# Patient Record
Sex: MALE | Race: WHITE | Employment: UNEMPLOYED | URBAN - NONMETROPOLITAN AREA
[De-identification: names, ages, dates, MRNs, and addresses within clinical notes are randomized per-mention and may not be internally consistent; named-entity substitution may affect disease eponyms.]

---

## 2023-06-27 ENCOUNTER — OFFICE VISIT (OUTPATIENT)
Dept: ENT CLINIC | Age: 63
End: 2023-06-27
Payer: COMMERCIAL

## 2023-06-27 VITALS
HEIGHT: 71 IN | WEIGHT: 167 LBS | SYSTOLIC BLOOD PRESSURE: 124 MMHG | BODY MASS INDEX: 23.38 KG/M2 | DIASTOLIC BLOOD PRESSURE: 68 MMHG

## 2023-06-27 DIAGNOSIS — K21.9 LARYNGOPHARYNGEAL REFLUX (LPR): Primary | ICD-10-CM

## 2023-06-27 DIAGNOSIS — R09.89 GLOBUS SENSATION: ICD-10-CM

## 2023-06-27 PROBLEM — R09.A2 GLOBUS SENSATION: Status: ACTIVE | Noted: 2023-06-27

## 2023-06-27 PROCEDURE — 99203 OFFICE O/P NEW LOW 30 MIN: CPT | Performed by: PHYSICIAN ASSISTANT

## 2023-06-27 PROCEDURE — 31575 DIAGNOSTIC LARYNGOSCOPY: CPT | Performed by: PHYSICIAN ASSISTANT

## 2023-06-27 RX ORDER — ASPIRIN 81 MG/1
81 TABLET, CHEWABLE ORAL DAILY
COMMUNITY

## 2023-06-27 RX ORDER — HYDROCODONE BITARTRATE AND ACETAMINOPHEN 10; 325 MG/1; MG/1
TABLET ORAL
COMMUNITY
Start: 2023-06-02

## 2023-06-27 RX ORDER — CLONAZEPAM 0.5 MG/1
0.5 TABLET ORAL 2 TIMES DAILY
COMMUNITY
Start: 2023-04-03

## 2023-06-27 RX ORDER — OMEPRAZOLE 20 MG/1
20 CAPSULE, DELAYED RELEASE ORAL
Qty: 60 CAPSULE | Refills: 2 | Status: SHIPPED | OUTPATIENT
Start: 2023-06-27

## 2023-06-27 RX ORDER — FLUOXETINE HYDROCHLORIDE 20 MG/1
CAPSULE ORAL
COMMUNITY
Start: 2023-06-02

## 2023-06-27 RX ORDER — VITAMIN B COMPLEX
1 CAPSULE ORAL DAILY
COMMUNITY

## 2023-06-27 RX ORDER — TESTOSTERONE CYPIONATE 200 MG/ML
INJECTION, SOLUTION INTRAMUSCULAR
COMMUNITY
Start: 2023-06-02

## 2023-06-27 ASSESSMENT — ENCOUNTER SYMPTOMS
TROUBLE SWALLOWING: 0
FACIAL SWELLING: 0
RHINORRHEA: 0
SINUS PRESSURE: 0
EYE PAIN: 0
SINUS PAIN: 0
SORE THROAT: 0
VOICE CHANGE: 0
PHOTOPHOBIA: 0

## 2023-07-14 ENCOUNTER — TELEPHONE (OUTPATIENT)
Dept: ENT CLINIC | Age: 63
End: 2023-07-14

## 2023-07-17 ENCOUNTER — TELEPHONE (OUTPATIENT)
Dept: ENT CLINIC | Age: 63
End: 2023-07-17

## 2023-07-17 NOTE — TELEPHONE ENCOUNTER
Talk to the patient about his omeprazole. Patient reports that the omeprazole was making his reflux worse however he admitted to sporadic episode of constipation that required him to take a laxative. He reports that after the laxative his reflux got better. I advised the patient that the omeprazole and his pain medication is likely causing the constipation and that he will need to take a daily dose of MiraLAX. Advised the patient that if he has recurring reflux symptoms, he is to call the office and we can potentially could change to a different PPI.       Electronically signed by Ellen Salgado PA-C on 7/17/23 at 5:51 PM CDT

## 2023-08-08 ENCOUNTER — OFFICE VISIT (OUTPATIENT)
Dept: ENT CLINIC | Age: 63
End: 2023-08-08
Payer: COMMERCIAL

## 2023-08-08 VITALS
DIASTOLIC BLOOD PRESSURE: 64 MMHG | WEIGHT: 163 LBS | SYSTOLIC BLOOD PRESSURE: 122 MMHG | HEIGHT: 71 IN | BODY MASS INDEX: 22.82 KG/M2

## 2023-08-08 DIAGNOSIS — K21.9 LARYNGOPHARYNGEAL REFLUX (LPR): Primary | ICD-10-CM

## 2023-08-08 PROCEDURE — 99213 OFFICE O/P EST LOW 20 MIN: CPT | Performed by: PHYSICIAN ASSISTANT

## 2023-08-08 NOTE — PROGRESS NOTES
Mr. Chris Jordan is a pleasant 41-year-old  male that presents for a 6-week follow-up for LPR. 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 He continues to deny any dysphagia to include solid or liquids. Physical examination demonstrated obvious less hoarseness to vocalization. Ears demonstrate normal TMs and canals bilaterally. Nasal exam demonstrated no remarkable change. Oral exam still demonstrates some erythematous changes to the posterior pharynx with no masses. Neck exam demonstrated no lymphadenopathy or thyromegaly. Impression: Clinically improving LPR with twice daily PPI therapy    Plan: Patient is to continue the PPI therapy twice a day for another 6 weeks. He is follow-up in 6 weeks for reevaluation. At that point hopefully we can start weaning his medication to 1 a day. Patient was reminded to call if he has any questions or problems.       Electronically signed by Kerri Cummings PA-C on 8/8/23 at 9:17 AM KARISSA

## 2024-01-18 ENCOUNTER — OFFICE VISIT (OUTPATIENT)
Dept: ENT CLINIC | Age: 64
End: 2024-01-18
Payer: COMMERCIAL

## 2024-01-18 VITALS
DIASTOLIC BLOOD PRESSURE: 76 MMHG | BODY MASS INDEX: 23.24 KG/M2 | WEIGHT: 166 LBS | HEIGHT: 71 IN | SYSTOLIC BLOOD PRESSURE: 126 MMHG

## 2024-01-18 DIAGNOSIS — R49.0 HOARSENESS OF VOICE: Primary | ICD-10-CM

## 2024-01-18 PROCEDURE — 99213 OFFICE O/P EST LOW 20 MIN: CPT | Performed by: PHYSICIAN ASSISTANT

## 2024-01-18 PROCEDURE — 31575 DIAGNOSTIC LARYNGOSCOPY: CPT | Performed by: PHYSICIAN ASSISTANT

## 2024-01-18 ASSESSMENT — ENCOUNTER SYMPTOMS
SINUS PRESSURE: 0
VOICE CHANGE: 0
FACIAL SWELLING: 0
SINUS PAIN: 0
EYE PAIN: 0
PHOTOPHOBIA: 0
TROUBLE SWALLOWING: 0
RHINORRHEA: 0
SORE THROAT: 0

## 2024-01-18 NOTE — PROGRESS NOTES
[facility] OTOLARYNGOLOGY/ENT  Mr. Damon is a pleasant 63-year-old  male that presents for a follow-up due to vocal hoarseness.  Patient was initially seen back in June where he was found to have evidence of LPR based on laryngoscopy.  He was placed on omeprazole where the patient had a reaction with nausea and severe constipation.  Patient had discontinued his medication and tried apple cider vinegar mixed with honey and cinnamon.  He reports that after 2 weeks his hoarseness and globus sensation totally resolved.    Overall he was doing well until about a month ago when he has now noticed recurrence of his vocal hoarseness.  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  He also reports that his singing voice has never return from the initial evaluation.  He continues to deny any dysphagia to include solids or liquids.  Patient does not use tobacco products.        Allergies: Patient has no known allergies.      Current Outpatient Medications   Medication Sig Dispense Refill    FLUoxetine (PROZAC) 20 MG capsule       HYDROcodone-acetaminophen (NORCO)  MG per tablet       clonazePAM (KLONOPIN) 0.5 MG tablet Take 1 tablet by mouth 2 times daily.      testosterone cypionate (DEPOTESTOTERONE CYPIONATE) 200 MG/ML injection       b complex vitamins capsule Take 1 capsule by mouth daily      Flavoring Agent (MOLASSES FLAVOR) POWD by Does not apply route      aspirin 81 MG chewable tablet Take 1 tablet by mouth daily       No current facility-administered medications for this visit.       Past Surgical History:   Procedure Laterality Date    ARM SURGERY      GROIN SURGERY         Past Medical History:   Diagnosis Date    Anxiety     GERD (gastroesophageal reflux disease)     Nerve entrapment of lower limb     Sleep apnea        Family History   Problem Relation Age of Onset    Cancer Father     Cancer Sister     Cancer Paternal Aunt     Cancer Maternal

## 2024-01-18 NOTE — ASSESSMENT & PLAN NOTE
Recurrent hoarseness of voice likely secondary to low-grade LPR-improved compared to last laryngoscopy  Plan: I recommended 2 weeks of strict vocal rest due to his occupation.  I also recommended trying Pepcid twice a day for acid coverage.  Patient reports that he prefers apple cider vinegar regiment.  He is to follow-up with me in 6 weeks for reevaluation.

## 2024-02-05 ENCOUNTER — TELEPHONE (OUTPATIENT)
Dept: ENT CLINIC | Age: 64
End: 2024-02-05

## 2024-02-05 NOTE — TELEPHONE ENCOUNTER
Catarino requests that Artur Bowles return his call. The best time to reach him is Anytime. Catarino would like a call back to discuss possibly getting a scope on his vocal cords or discuss further evaluation if needed?     Thank you.

## 2024-02-20 ENCOUNTER — OFFICE VISIT (OUTPATIENT)
Dept: ENT CLINIC | Age: 64
End: 2024-02-20
Payer: COMMERCIAL

## 2024-02-20 VITALS
HEIGHT: 71 IN | DIASTOLIC BLOOD PRESSURE: 66 MMHG | WEIGHT: 167 LBS | BODY MASS INDEX: 23.38 KG/M2 | SYSTOLIC BLOOD PRESSURE: 118 MMHG

## 2024-02-20 DIAGNOSIS — K21.9 LARYNGOPHARYNGEAL REFLUX (LPR): ICD-10-CM

## 2024-02-20 DIAGNOSIS — R49.0 HOARSENESS OF VOICE: Primary | ICD-10-CM

## 2024-02-20 PROCEDURE — 99213 OFFICE O/P EST LOW 20 MIN: CPT | Performed by: PHYSICIAN ASSISTANT

## 2024-02-20 PROCEDURE — 31575 DIAGNOSTIC LARYNGOSCOPY: CPT | Performed by: PHYSICIAN ASSISTANT

## 2024-02-20 ASSESSMENT — ENCOUNTER SYMPTOMS
SORE THROAT: 0
PHOTOPHOBIA: 0
VOICE CHANGE: 0
RHINORRHEA: 0
EYE PAIN: 0
TROUBLE SWALLOWING: 0
SINUS PAIN: 0
SINUS PRESSURE: 0
FACIAL SWELLING: 0

## 2024-02-20 NOTE — PROGRESS NOTES
posterior wall of the nasopharyngeal region demonstrated no remarkable findings.  The scope was then advanced to the oropharyngeal region where the patient was found to have normal swallowing with no erythematous changes or any masses.  I advanced the scope to the epiglottis where the vocal cords were well-visualized.  The vocal cords were symmetrical and fully functional.  I did not appreciate any erythematous changes or any edema with the overall appearance noted back to normal.  Evaluation of the glottis demonstrated a normal-appearing surface with no edema or erythema.  The tongue was extended and demonstrated no lesions or masses to be present.  Patient tolerated the procedure nicely with no complications.       No orders of the defined types were placed in this encounter.      Electronically signed by LINDA MARROQUIN PA-C on 2/20/24 at 2:03 PM CST        Please note that this chart was generated using dragon dictation software.  Although every effort was made to ensure the accuracy of this automated transcription, some errors in transcription may have occurred.

## 2024-02-20 NOTE — ASSESSMENT & PLAN NOTE
Resolved hoarseness of voice with no evidence of LPR based on today's laryngoscopy  Plan: Due to the patient being back to baseline, I recommended a follow-up as needed.  He was reminded to call if he has any vocal changes or has any questions.

## 2024-05-07 ENCOUNTER — TELEPHONE (OUTPATIENT)
Dept: ENT CLINIC | Age: 64
End: 2024-05-07

## 2024-05-07 NOTE — TELEPHONE ENCOUNTER
Catarino called this morning to advise that his throat is still throbbing despite vocal rest. He wishes to go ahead with referral to [place], but if the doctor needs to see him first, he said by all means to give him a call.    Thank you.

## 2024-05-14 ENCOUNTER — OFFICE VISIT (OUTPATIENT)
Dept: ENT CLINIC | Age: 64
End: 2024-05-14
Payer: COMMERCIAL

## 2024-05-14 VITALS
DIASTOLIC BLOOD PRESSURE: 66 MMHG | BODY MASS INDEX: 22.82 KG/M2 | HEIGHT: 71 IN | SYSTOLIC BLOOD PRESSURE: 126 MMHG | WEIGHT: 163 LBS

## 2024-05-14 DIAGNOSIS — R49.0 HOARSENESS OF VOICE: Primary | ICD-10-CM

## 2024-05-14 PROCEDURE — 99212 OFFICE O/P EST SF 10 MIN: CPT | Performed by: PHYSICIAN ASSISTANT

## 2024-05-14 NOTE — PROGRESS NOTES
Mr. Damon is a very pleasant 63-year-old  male that represents to the clinic with complaints of recurrent vocal fatigue with hoarseness.  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    Initially he complained of having throat pain that worsens to the point that he developed vocal hoarseness.  His previous laryngoscopy demonstrated evidence of mild to moderate reflux disease and was attempted to be treated with PPI therapy.  Unfortunately he could not tolerate the PPI therapy due to constipation and nausea.  He self treated himself with apple cider vinegar.  He was also placed on vocal rest for a month and was noted to improve.  He continues to deny any dysphagia to include solids or liquids.  Also denies any true globus sensation.      Physical examination revealed the patient to have normal vocals with no hoarseness appreciated.  Ears demonstrate normal TMs canals bilaterally.  Oral exam demonstrated no masses or any abnormalities to the posterior pharynx.  The tongue demonstrated no lesions or any abnormalities to the surface of the tongue.  No buccal mucosal lesions were appreciated.  Neck exam demonstrated no lymphadenopathy or thyromegaly.      Impression: Recurrent vocal fatigue with hoarseness    Plan: Due to the patient's symptoms returning, I have recommended referring him to the [facility] [place] for further evaluation.  Overall I believe that this may be more of an over usage issue.  Patient was reminded to call if his symptoms worsen or if he has any questions.      Electronically signed by LINDA MARROQUIN PA-C on 5/14/24 at 2:49 PM CDT

## 2024-05-19 ENCOUNTER — OFFICE VISIT (OUTPATIENT)
Age: 64
End: 2024-05-19
Payer: COMMERCIAL

## 2024-05-19 VITALS
OXYGEN SATURATION: 97 % | SYSTOLIC BLOOD PRESSURE: 136 MMHG | TEMPERATURE: 97.7 F | WEIGHT: 168 LBS | RESPIRATION RATE: 20 BRPM | BODY MASS INDEX: 23.52 KG/M2 | HEART RATE: 97 BPM | HEIGHT: 71 IN | DIASTOLIC BLOOD PRESSURE: 74 MMHG

## 2024-05-19 DIAGNOSIS — H66.91 RIGHT OTITIS MEDIA, UNSPECIFIED OTITIS MEDIA TYPE: Primary | ICD-10-CM

## 2024-05-19 PROCEDURE — 99203 OFFICE O/P NEW LOW 30 MIN: CPT | Performed by: NURSE PRACTITIONER

## 2024-05-19 RX ORDER — AMOXICILLIN 500 MG/1
500 CAPSULE ORAL 2 TIMES DAILY
Qty: 20 CAPSULE | Refills: 0 | Status: SHIPPED | OUTPATIENT
Start: 2024-05-19 | End: 2024-05-29

## 2024-08-09 ENCOUNTER — OFFICE VISIT (OUTPATIENT)
Age: 64
End: 2024-08-09
Payer: COMMERCIAL

## 2024-08-09 ENCOUNTER — HOSPITAL ENCOUNTER (OUTPATIENT)
Dept: GENERAL RADIOLOGY | Age: 64
Discharge: HOME | End: 2024-08-09
Payer: COMMERCIAL

## 2024-08-09 VITALS
TEMPERATURE: 97.2 F | OXYGEN SATURATION: 98 % | BODY MASS INDEX: 22.79 KG/M2 | HEIGHT: 71 IN | HEART RATE: 77 BPM | SYSTOLIC BLOOD PRESSURE: 112 MMHG | DIASTOLIC BLOOD PRESSURE: 78 MMHG | RESPIRATION RATE: 20 BRPM | WEIGHT: 162.8 LBS

## 2024-08-09 DIAGNOSIS — S99.922A FOOT INJURY, LEFT, INITIAL ENCOUNTER: ICD-10-CM

## 2024-08-09 DIAGNOSIS — S99.922A FOOT INJURY, LEFT, INITIAL ENCOUNTER: Primary | ICD-10-CM

## 2024-08-09 DIAGNOSIS — M79.675 TOE PAIN, LEFT: ICD-10-CM

## 2024-08-09 PROCEDURE — 99213 OFFICE O/P EST LOW 20 MIN: CPT

## 2024-08-09 PROCEDURE — 73630 X-RAY EXAM OF FOOT: CPT

## 2024-08-09 ASSESSMENT — ENCOUNTER SYMPTOMS
VOMITING: 0
TROUBLE SWALLOWING: 0
APNEA: 0
DIARRHEA: 0
STRIDOR: 0
SORE THROAT: 0
CONSTIPATION: 0
ABDOMINAL PAIN: 0
WHEEZING: 0
CHOKING: 0
SHORTNESS OF BREATH: 0
COUGH: 0
CHEST TIGHTNESS: 0
SINUS PAIN: 0
FACIAL SWELLING: 0
COLOR CHANGE: 0
NAUSEA: 0
EYE DISCHARGE: 0
ABDOMINAL DISTENTION: 0
EYE PAIN: 0
SINUS PRESSURE: 0
EYE REDNESS: 0

## 2024-08-09 NOTE — PROGRESS NOTES
[facility]  [facility]  [address] [place] [id number]  Dept: [phone]  Dept Fax: [phone]  Loc: [phone]    Catarino Damon is a 63 y.o. male who presents today for his medical conditions/complaints as noted below.  Catarino Damon is complaining of Toe Injury (Couple weeks ago /Lt foot - great toe and second toe pain /)        HPI:   Catarino Damon presents to the clinic today with complaints of left first and second toe pain following an injury that occurred 2 weeks ago.  Patient reports he was walking in sandals when he hit his foot on a loose brick in the joyce, causing an injury to the left first and second toes.  He reports he has been elevating the left foot, applying heat, and taking Norco as prescribed for other pain.  Patient reports he took a Norco at approximately 6 AM this morning.  He reports that pain is tolerable with Norco, but he is requesting an x-ray to see if his toes are broken.  Denies numbness, tingling, paresthesias.  Symptoms are moderate.  Patient stable.          Past Medical History:   Diagnosis Date    Anxiety     GERD (gastroesophageal reflux disease)     Nerve entrapment of lower limb     Sleep apnea        Past Surgical History:   Procedure Laterality Date    ARM SURGERY      GROIN SURGERY         Family History   Problem Relation Age of Onset    Cancer Father     Cancer Sister     Cancer Paternal Aunt     Cancer Maternal Grandfather     Cancer Paternal Grandmother        Social History     Tobacco Use    Smoking status: Never    Smokeless tobacco: Never   Substance Use Topics    Alcohol use: Never        Current Outpatient Medications   Medication Sig Dispense Refill    HYDROcodone-acetaminophen (NORCO)  MG per tablet       clonazePAM (KLONOPIN) 0.5 MG tablet Take 1 tablet by mouth 2 times daily.      testosterone cypionate (DEPOTESTOTERONE CYPIONATE) 200 MG/ML injection       b complex vitamins

## 2024-08-09 NOTE — PROGRESS NOTES
Pt stated he walks around the house in sandals, may have loose brick in joyce, hit toes on the joyce and it bent toes back. Has been dealing with the pain for a couple of weeks now.   Pt stated for 2-3 weeks he has been elevating with heating.   Pt stated he is on pain medication for pain from nerve damage. He has taken that today at 0600  Xray is requested

## 2024-08-09 NOTE — PATIENT INSTRUCTIONS
Xray of left foot ordered; will call with results.    Rotate ice/heat as needed - use only 15 minutes at a time    Encouraged adequate rest    Recommended compression/bracing    Recommended elevation of the area    Patient may use ibuprofen or tylenol for pain    The patient is to follow up with PCP or return to clinic if symptoms worsen/fail to improve.    Any condition can change, despite proper treatment. Therefore, if symptoms still persist or worsen after treatment plan intitated today, either go to the nearest ER, or call PCP, or return to UC for further evaluation.    Urgent Care evaluation today is not a substitute for PCP visit. Follow up care is your responsibility to discuss and review this UC visit.

## 2024-10-16 ENCOUNTER — OFFICE VISIT (OUTPATIENT)
Age: 64
End: 2024-10-16
Payer: COMMERCIAL

## 2024-10-16 VITALS — WEIGHT: 163 LBS | HEIGHT: 71 IN | BODY MASS INDEX: 22.82 KG/M2

## 2024-10-16 DIAGNOSIS — S99.922A PLANTAR PLATE INJURY, LEFT, INITIAL ENCOUNTER: Primary | ICD-10-CM

## 2024-10-16 PROCEDURE — 99203 OFFICE O/P NEW LOW 30 MIN: CPT | Performed by: ORTHOPAEDIC SURGERY

## 2024-10-16 NOTE — PROGRESS NOTES
Orthopaedic Clinic Note    NAME:  Catarino Damon III   : 1960  MRN: [id number]      10/16/2024     CHIEF COMPLAINT:  Left foot pain      HISTORY OF PRESENT ILLNESS:   Catarino is a 63 y.o. male who presents to the office for evaluation and treatment of the left foot.  He injured his left great toe when he was stepping on a stair that collapsed approximately 10 weeks ago causing an extreme amount of dorsiflexion of the great toe.  He is attempted the use of a hard sole shoe, icing, and anti-inflammatories without relief and his pain is worsening.  Complains of burning on the plantar aspect of the great toe.    Past Medical History:        Diagnosis Date    Anxiety     GERD (gastroesophageal reflux disease)     Nerve entrapment of lower limb     Sleep apnea        Past Surgical History:        Procedure Laterality Date    ARM SURGERY      GROIN SURGERY      OTHER SURGICAL HISTORY      Hystoplasmosis       Current Medications:   Prior to Admission medications    Medication Sig Start Date End Date Taking? Authorizing Provider   HYDROcodone-acetaminophen (NORCO)  MG per tablet  23  Yes Isabel Anderson MD   clonazePAM (KLONOPIN) 0.5 MG tablet Take 1 tablet by mouth 2 times daily. 4/3/23  Yes Isabel Anderson MD   testosterone cypionate (DEPOTESTOTERONE CYPIONATE) 200 MG/ML injection  23  Yes Isabel Anderson MD   b complex vitamins capsule Take 1 capsule by mouth daily   Yes Isabel Anderson MD   Flavoring Agent (MOLASSES FLAVOR) POWD by Does not apply route   Yes Isabel Anderson MD   aspirin 81 MG chewable tablet Take 1 tablet by mouth daily  Patient not taking: Reported on 10/16/2024    Isabel Anderson MD       Allergies:  Patient has no known allergies.    Social History:   Social History     Occupational History    Not on file   Tobacco Use    Smoking status: Never    Smokeless tobacco: Never   Substance and Sexual Activity    Alcohol use: Never    Drug use: Never

## 2024-10-23 ENCOUNTER — HOSPITAL ENCOUNTER (OUTPATIENT)
Dept: MRI IMAGING | Age: 64
Discharge: HOME | End: 2024-10-23
Attending: ORTHOPAEDIC SURGERY
Payer: COMMERCIAL

## 2024-10-23 DIAGNOSIS — S99.922A PLANTAR PLATE INJURY, LEFT, INITIAL ENCOUNTER: ICD-10-CM

## 2024-10-23 PROCEDURE — 73718 MRI LOWER EXTREMITY W/O DYE: CPT

## 2024-10-24 DIAGNOSIS — S99.922A PLANTAR PLATE INJURY, LEFT, INITIAL ENCOUNTER: Primary | ICD-10-CM

## 2024-10-24 RX ORDER — MELOXICAM 15 MG/1
15 TABLET ORAL DAILY PRN
Qty: 30 TABLET | Refills: 1 | Status: SHIPPED | OUTPATIENT
Start: 2024-10-24

## 2024-10-26 DIAGNOSIS — S99.922A PLANTAR PLATE INJURY, LEFT, INITIAL ENCOUNTER: Primary | ICD-10-CM

## 2024-10-26 RX ORDER — METHYLPREDNISOLONE 4 MG/1
TABLET ORAL
Qty: 1 KIT | Refills: 0 | Status: SHIPPED | OUTPATIENT
Start: 2024-10-26 | End: 2024-11-01

## 2024-11-02 DIAGNOSIS — S99.922A PLANTAR PLATE INJURY, LEFT, INITIAL ENCOUNTER: Primary | ICD-10-CM

## 2024-11-02 RX ORDER — KETOROLAC TROMETHAMINE 10 MG/1
10 TABLET, FILM COATED ORAL EVERY 6 HOURS PRN
Qty: 20 TABLET | Refills: 0 | Status: SHIPPED | OUTPATIENT
Start: 2024-11-02

## 2025-03-05 ENCOUNTER — CLINICAL DOCUMENTATION (OUTPATIENT)
Age: 65
End: 2025-03-05

## 2025-03-05 NOTE — PROGRESS NOTES
Mr. Damon called and would like to proceed with surgery for his left foot.  He has severe MTP arthritis that has not improved with shoe wear changes and injections.  We discussed an MTP fusion, the risks, benefits, and alternatives.      He will be scheduled for surgery and return 2 weeks postop.    Electronically signed by William Venegas MD on 3/5/2025 at 1:01 PM

## 2025-03-06 ENCOUNTER — TELEPHONE (OUTPATIENT)
Age: 65
End: 2025-03-06

## 2025-03-06 NOTE — TELEPHONE ENCOUNTER
Spoke with patient and picked a date for surgery, 4/1/25 @ [facility] per patient request. Informed patient PreOp department would call for any labwork needed and he would be called day before surgery for confirmation of arrival time. Patient voiced understanding.

## 2025-03-13 ENCOUNTER — PREP FOR PROCEDURE (OUTPATIENT)
Age: 65
End: 2025-03-13

## 2025-03-13 DIAGNOSIS — M19.072 ARTHRITIS OF FIRST METATARSOPHALANGEAL (MTP) JOINT OF LEFT FOOT: Primary | ICD-10-CM

## 2025-03-13 RX ORDER — SODIUM CHLORIDE 0.9 % (FLUSH) 0.9 %
5-40 SYRINGE (ML) INJECTION EVERY 12 HOURS SCHEDULED
Status: CANCELLED | OUTPATIENT
Start: 2025-04-01

## 2025-03-13 RX ORDER — SODIUM CHLORIDE 0.9 % (FLUSH) 0.9 %
5-40 SYRINGE (ML) INJECTION PRN
Status: CANCELLED | OUTPATIENT
Start: 2025-04-01

## 2025-03-13 RX ORDER — SODIUM CHLORIDE 9 MG/ML
INJECTION, SOLUTION INTRAVENOUS PRN
Status: CANCELLED | OUTPATIENT
Start: 2025-04-01

## 2025-03-31 NOTE — OP NOTE
Patient Name: Brant  MRN: [id number]  : 1960    DATE of SURGERY: 2025    SURGEON: William Venegas MD    ASSISTANT: NONE     PREOPERATIVE DIAGNOSIS: Primary osteoarthritis Left 1st metatarsophalangeal joint (Hallux rigidus)     POSTOPERATIVE DIAGNOSIS: Primary osteoarthritis Left 1st metatarsophalangeal joint (Hallux rigidus)     PROCEDURE PERFORMED: Left 1st metatarsophalangeal joint arthrodesis.     IMPLANTS: Arthrex.     ANESTHESIA USED: General endotracheal anesthesia.     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  He had a hyperflexion injury months ago.  Radiographs and MRI confirmed severe arthritic change of this joint. We attempted conservative treatment with injections, anti-inflammatories, as well as changing shoes. All these failed to improve symptoms long term. After failing conservative treatment, the patient wished to proceed with surgery. The surgery of choice given the amount of arthritic change was that of a fusion of the 1st MTP joint, and the patient understood the risks, benefits and alternatives. The risks included but were not limited to that of anesthesia, bleeding, infection, pain, damage to local structures, need for further surgery, malunion, nonunion, hardware failure, loss of function.     ESTIMATED BLOOD LOSS: Less than 10 mL     SPECIMENS: None.     DRAINS: None.     COMPLICATIONS: None.     PROCEDURE IN DETAIL: The patient was seen in the preoperative holding room; once again the informed consent form was reviewed with the patient and signed. The site of surgery was marked with his agreement. The patient was transported to the operating room where a timeout was performed identifying the correct patient as well as the operative site. Two grams of IV Kefzol was given as perioperative antibiotics. The left lower extremity was prepped and draped in usual sterile fashion.  Mesenteric angiogram and possible embolization

## 2025-03-31 NOTE — DISCHARGE INSTRUCTIONS
Lower Extremity Post-op Instructions  Dr. FRANCOIS        POST-OP CARE: Please follow these instructions closely!    IMPORTANT PHONE NUMBERS:  For emergencies, please call 911  You may reach Dr. Francois or his medical assistants at [phone], M-F 8:00am-5:00pm  After 5pm or on the weekends, please call the answering service which can be reached from the number above   Call immediately if you have any of the following symptoms:  Elevated temperature above 101.5 degrees for more than 48 hours after surgery  Persistent drainage from wound  Severe pain around surgical site  Calf pain    Weight Bearing:   __x___ Heel walking in hard soled shoe only    Bathing:  If stated below, it is ok to remove your postop dressing and shower.  DO NOT SOAK the incision in water.  Pat the incision site dry after surgery  _x__ You may remove your dressing and shower on the 3rd day following surgery; if you shower before this, please cover your incision thoroughly  ___Keep your splint/dressing clean, dry, intact.  Do not place foreign objects inside the splint/dressing.    Dressings: Do NOT remove dressing/splint unless unless told to do so. SOME DRAINAGE IS NORMAL!  If you have a splint or cast, do NOT get wet!!    DO NOT touch, remove, or apply ointment to the incision and/or steri strips  Steri strips may fall off on their own  Signs of infection that warrant a phone call to our clinical line:  Excessive drainage or redness  Red streaking coming away from the incision  Increased pain  Increased temperature above 101.5 degrees    DRIVING:  Absolutely no driving while taking pain medication.  This will be discussed at your first postop visit.    Incision: Do NOT uncover unless you are told it is ok (see above).  Black sutures will need to be removed 10-14 days after surgery.    Elevate: Place 2 pillows under your ankle to get the incision area above the level of the heart to help in swelling (a recliner is not elevated!!!)    Ice: Ice

## 2025-04-01 ENCOUNTER — APPOINTMENT (OUTPATIENT)
Dept: GENERAL RADIOLOGY | Age: 65
End: 2025-04-01
Attending: ORTHOPAEDIC SURGERY
Payer: COMMERCIAL

## 2025-04-01 ENCOUNTER — HOSPITAL ENCOUNTER (OUTPATIENT)
Age: 65
Setting detail: OUTPATIENT SURGERY
Discharge: HOME | End: 2025-04-01
Attending: ORTHOPAEDIC SURGERY | Admitting: ORTHOPAEDIC SURGERY
Payer: COMMERCIAL

## 2025-04-01 ENCOUNTER — ANESTHESIA EVENT (OUTPATIENT)
Dept: OPERATING ROOM | Age: 65
End: 2025-04-01
Payer: COMMERCIAL

## 2025-04-01 ENCOUNTER — ANESTHESIA (OUTPATIENT)
Dept: OPERATING ROOM | Age: 65
End: 2025-04-01
Payer: COMMERCIAL

## 2025-04-01 VITALS
WEIGHT: 163 LBS | HEART RATE: 85 BPM | SYSTOLIC BLOOD PRESSURE: 125 MMHG | DIASTOLIC BLOOD PRESSURE: 82 MMHG | HEIGHT: 71 IN | OXYGEN SATURATION: 95 % | BODY MASS INDEX: 22.82 KG/M2 | TEMPERATURE: 98 F | RESPIRATION RATE: 16 BRPM

## 2025-04-01 DIAGNOSIS — M19.072 ARTHRITIS OF FIRST METATARSOPHALANGEAL (MTP) JOINT OF LEFT FOOT: ICD-10-CM

## 2025-04-01 DIAGNOSIS — S46.011D TRAUMATIC INCOMPLETE TEAR OF RIGHT ROTATOR CUFF, SUBSEQUENT ENCOUNTER: Primary | ICD-10-CM

## 2025-04-01 PROCEDURE — 7100000001 HC PACU RECOVERY - ADDTL 15 MIN: Performed by: ORTHOPAEDIC SURGERY

## 2025-04-01 PROCEDURE — 7100000000 HC PACU RECOVERY - FIRST 15 MIN: Performed by: ORTHOPAEDIC SURGERY

## 2025-04-01 PROCEDURE — 3600000013 HC SURGERY LEVEL 3 ADDTL 15MIN: Performed by: ORTHOPAEDIC SURGERY

## 2025-04-01 PROCEDURE — 73630 X-RAY EXAM OF FOOT: CPT

## 2025-04-01 PROCEDURE — 2580000003 HC RX 258: Performed by: ANESTHESIOLOGY

## 2025-04-01 PROCEDURE — 2709999900 HC NON-CHARGEABLE SUPPLY: Performed by: ORTHOPAEDIC SURGERY

## 2025-04-01 PROCEDURE — 2500000003 HC RX 250 WO HCPCS: Performed by: ANESTHESIOLOGY

## 2025-04-01 PROCEDURE — 7100000011 HC PHASE II RECOVERY - ADDTL 15 MIN: Performed by: ORTHOPAEDIC SURGERY

## 2025-04-01 PROCEDURE — 6360000002 HC RX W HCPCS: Performed by: ANESTHESIOLOGY

## 2025-04-01 PROCEDURE — 6360000002 HC RX W HCPCS: Performed by: ORTHOPAEDIC SURGERY

## 2025-04-01 PROCEDURE — 64445 NJX AA&/STRD SCIATIC NRV IMG: CPT | Performed by: NURSE ANESTHETIST, CERTIFIED REGISTERED

## 2025-04-01 PROCEDURE — 6360000002 HC RX W HCPCS: Performed by: NURSE ANESTHETIST, CERTIFIED REGISTERED

## 2025-04-01 PROCEDURE — 7100000010 HC PHASE II RECOVERY - FIRST 15 MIN: Performed by: ORTHOPAEDIC SURGERY

## 2025-04-01 PROCEDURE — C1713 ANCHOR/SCREW BN/BN,TIS/BN: HCPCS | Performed by: ORTHOPAEDIC SURGERY

## 2025-04-01 PROCEDURE — 3700000000 HC ANESTHESIA ATTENDED CARE: Performed by: ORTHOPAEDIC SURGERY

## 2025-04-01 PROCEDURE — 3700000001 HC ADD 15 MINUTES (ANESTHESIA): Performed by: ORTHOPAEDIC SURGERY

## 2025-04-01 PROCEDURE — 3600000003 HC SURGERY LEVEL 3 BASE: Performed by: ORTHOPAEDIC SURGERY

## 2025-04-01 PROCEDURE — 2500000003 HC RX 250 WO HCPCS: Performed by: ORTHOPAEDIC SURGERY

## 2025-04-01 PROCEDURE — 2500000003 HC RX 250 WO HCPCS: Performed by: NURSE ANESTHETIST, CERTIFIED REGISTERED

## 2025-04-01 DEVICE — SCREW CORTEX 3 X 18: Type: IMPLANTABLE DEVICE | Status: FUNCTIONAL

## 2025-04-01 DEVICE — IMPLANTABLE DEVICE: Type: IMPLANTABLE DEVICE | Status: FUNCTIONAL

## 2025-04-01 DEVICE — SCREW KRULOCK 3.0 X 16: Type: IMPLANTABLE DEVICE | Status: FUNCTIONAL

## 2025-04-01 DEVICE — SCREW KRULOCK 3.0 X 20: Type: IMPLANTABLE DEVICE | Status: FUNCTIONAL

## 2025-04-01 DEVICE — LP SCREW 3.0X16MM CORTICAL MTP TI: Type: IMPLANTABLE DEVICE | Status: FUNCTIONAL

## 2025-04-01 RX ORDER — SODIUM CHLORIDE 0.9 % (FLUSH) 0.9 %
5-40 SYRINGE (ML) INJECTION PRN
Status: DISCONTINUED | OUTPATIENT
Start: 2025-04-01 | End: 2025-04-01 | Stop reason: HOSPADM

## 2025-04-01 RX ORDER — SODIUM CHLORIDE 9 MG/ML
INJECTION, SOLUTION INTRAVENOUS PRN
Status: DISCONTINUED | OUTPATIENT
Start: 2025-04-01 | End: 2025-04-01 | Stop reason: HOSPADM

## 2025-04-01 RX ORDER — LIDOCAINE HYDROCHLORIDE 10 MG/ML
INJECTION, SOLUTION INFILTRATION; PERINEURAL
Status: DISCONTINUED | OUTPATIENT
Start: 2025-04-01 | End: 2025-04-01 | Stop reason: SDUPTHER

## 2025-04-01 RX ORDER — SODIUM CHLORIDE 0.9 % (FLUSH) 0.9 %
5-40 SYRINGE (ML) INJECTION EVERY 12 HOURS SCHEDULED
Status: DISCONTINUED | OUTPATIENT
Start: 2025-04-01 | End: 2025-04-01 | Stop reason: HOSPADM

## 2025-04-01 RX ORDER — ONDANSETRON 2 MG/ML
INJECTION INTRAMUSCULAR; INTRAVENOUS
Status: DISCONTINUED | OUTPATIENT
Start: 2025-04-01 | End: 2025-04-01 | Stop reason: SDUPTHER

## 2025-04-01 RX ORDER — EPHEDRINE SULFATE/0.9% NACL/PF 25 MG/5 ML
SYRINGE (ML) INTRAVENOUS
Status: DISCONTINUED | OUTPATIENT
Start: 2025-04-01 | End: 2025-04-01 | Stop reason: SDUPTHER

## 2025-04-01 RX ORDER — HYDROCODONE BITARTRATE AND ACETAMINOPHEN 10; 325 MG/1; MG/1
1 TABLET ORAL EVERY 6 HOURS PRN
Qty: 20 TABLET | Refills: 0 | Status: SHIPPED | OUTPATIENT
Start: 2025-04-01 | End: 2025-04-06

## 2025-04-01 RX ORDER — HYDROCODONE BITARTRATE AND ACETAMINOPHEN 10; 325 MG/1; MG/1
1 TABLET ORAL EVERY 6 HOURS PRN
Status: DISCONTINUED | OUTPATIENT
Start: 2025-04-01 | End: 2025-04-01 | Stop reason: HOSPADM

## 2025-04-01 RX ORDER — DEXAMETHASONE SODIUM PHOSPHATE 10 MG/ML
INJECTION, SOLUTION INTRAMUSCULAR; INTRAVENOUS
Status: DISCONTINUED | OUTPATIENT
Start: 2025-04-01 | End: 2025-04-01 | Stop reason: SDUPTHER

## 2025-04-01 RX ORDER — PROPOFOL 10 MG/ML
INJECTION, EMULSION INTRAVENOUS
Status: DISCONTINUED | OUTPATIENT
Start: 2025-04-01 | End: 2025-04-01 | Stop reason: SDUPTHER

## 2025-04-01 RX ORDER — ROPIVACAINE HYDROCHLORIDE 5 MG/ML
INJECTION, SOLUTION EPIDURAL; INFILTRATION; PERINEURAL
Status: COMPLETED | OUTPATIENT
Start: 2025-04-01 | End: 2025-04-01

## 2025-04-01 RX ORDER — ONDANSETRON 2 MG/ML
4 INJECTION INTRAMUSCULAR; INTRAVENOUS
Status: DISCONTINUED | OUTPATIENT
Start: 2025-04-01 | End: 2025-04-01 | Stop reason: HOSPADM

## 2025-04-01 RX ORDER — HYDROMORPHONE HYDROCHLORIDE 1 MG/ML
0.25 INJECTION, SOLUTION INTRAMUSCULAR; INTRAVENOUS; SUBCUTANEOUS EVERY 5 MIN PRN
Status: DISCONTINUED | OUTPATIENT
Start: 2025-04-01 | End: 2025-04-01 | Stop reason: HOSPADM

## 2025-04-01 RX ORDER — ONDANSETRON 4 MG/1
4 TABLET, FILM COATED ORAL EVERY 8 HOURS PRN
Qty: 10 TABLET | Refills: 0 | Status: SHIPPED | OUTPATIENT
Start: 2025-04-01

## 2025-04-01 RX ORDER — NALOXONE HYDROCHLORIDE 0.4 MG/ML
INJECTION, SOLUTION INTRAMUSCULAR; INTRAVENOUS; SUBCUTANEOUS PRN
Status: DISCONTINUED | OUTPATIENT
Start: 2025-04-01 | End: 2025-04-01 | Stop reason: HOSPADM

## 2025-04-01 RX ORDER — DEXAMETHASONE SODIUM PHOSPHATE 4 MG/ML
4 INJECTION, SOLUTION INTRA-ARTICULAR; INTRALESIONAL; INTRAMUSCULAR; INTRAVENOUS; SOFT TISSUE ONCE
Status: COMPLETED | OUTPATIENT
Start: 2025-04-01 | End: 2025-04-01

## 2025-04-01 RX ORDER — SODIUM CHLORIDE, SODIUM LACTATE, POTASSIUM CHLORIDE, CALCIUM CHLORIDE 600; 310; 30; 20 MG/100ML; MG/100ML; MG/100ML; MG/100ML
INJECTION, SOLUTION INTRAVENOUS CONTINUOUS
Status: DISCONTINUED | OUTPATIENT
Start: 2025-04-01 | End: 2025-04-01 | Stop reason: HOSPADM

## 2025-04-01 RX ORDER — FENTANYL CITRATE 50 UG/ML
INJECTION, SOLUTION INTRAMUSCULAR; INTRAVENOUS
Status: DISCONTINUED | OUTPATIENT
Start: 2025-04-01 | End: 2025-04-01 | Stop reason: SDUPTHER

## 2025-04-01 RX ORDER — HYDROMORPHONE HYDROCHLORIDE 1 MG/ML
0.5 INJECTION, SOLUTION INTRAMUSCULAR; INTRAVENOUS; SUBCUTANEOUS EVERY 5 MIN PRN
Status: COMPLETED | OUTPATIENT
Start: 2025-04-01 | End: 2025-04-01

## 2025-04-01 RX ORDER — ROPIVACAINE HYDROCHLORIDE 5 MG/ML
30 INJECTION, SOLUTION EPIDURAL; INFILTRATION; PERINEURAL ONCE
Status: DISCONTINUED | OUTPATIENT
Start: 2025-04-01 | End: 2025-04-01 | Stop reason: HOSPADM

## 2025-04-01 RX ORDER — MIDAZOLAM HYDROCHLORIDE 1 MG/ML
2 INJECTION, SOLUTION INTRAMUSCULAR; INTRAVENOUS ONCE
Status: COMPLETED | OUTPATIENT
Start: 2025-04-01 | End: 2025-04-01

## 2025-04-01 RX ADMIN — HYDROMORPHONE HYDROCHLORIDE 0.5 MG: 1 INJECTION, SOLUTION INTRAMUSCULAR; INTRAVENOUS; SUBCUTANEOUS at 09:09

## 2025-04-01 RX ADMIN — MIDAZOLAM HYDROCHLORIDE 2 MG: 1 INJECTION, SOLUTION INTRAMUSCULAR; INTRAVENOUS at 07:07

## 2025-04-01 RX ADMIN — EPHEDRINE SULFATE 5 MG: 5 INJECTION INTRAVENOUS at 07:47

## 2025-04-01 RX ADMIN — LIDOCAINE HYDROCHLORIDE 50 MG: 10 INJECTION, SOLUTION INFILTRATION; PERINEURAL at 07:31

## 2025-04-01 RX ADMIN — PROPOFOL 150 MG: 10 INJECTION, EMULSION INTRAVENOUS at 07:31

## 2025-04-01 RX ADMIN — EPHEDRINE SULFATE 5 MG: 5 INJECTION INTRAVENOUS at 07:54

## 2025-04-01 RX ADMIN — ROPIVACAINE HYDROCHLORIDE 20 ML: 5 INJECTION, SOLUTION EPIDURAL; INFILTRATION; PERINEURAL at 07:09

## 2025-04-01 RX ADMIN — DEXAMETHASONE SODIUM PHOSPHATE 5 MG: 10 INJECTION, SOLUTION INTRAMUSCULAR; INTRAVENOUS at 07:38

## 2025-04-01 RX ADMIN — SODIUM CHLORIDE, PRESERVATIVE FREE 20 MG: 5 INJECTION INTRAVENOUS at 06:58

## 2025-04-01 RX ADMIN — HYDROMORPHONE HYDROCHLORIDE 0.5 MG: 1 INJECTION, SOLUTION INTRAMUSCULAR; INTRAVENOUS; SUBCUTANEOUS at 09:32

## 2025-04-01 RX ADMIN — ONDANSETRON 4 MG: 2 INJECTION INTRAMUSCULAR; INTRAVENOUS at 08:40

## 2025-04-01 RX ADMIN — DEXAMETHASONE SODIUM PHOSPHATE 4 MG: 4 INJECTION INTRA-ARTICULAR; INTRALESIONAL; INTRAMUSCULAR; INTRAVENOUS; SOFT TISSUE at 06:58

## 2025-04-01 RX ADMIN — HYDROMORPHONE HYDROCHLORIDE 0.5 MG: 1 INJECTION, SOLUTION INTRAMUSCULAR; INTRAVENOUS; SUBCUTANEOUS at 09:22

## 2025-04-01 RX ADMIN — SODIUM CHLORIDE, SODIUM LACTATE, POTASSIUM CHLORIDE, AND CALCIUM CHLORIDE: .6; .31; .03; .02 INJECTION, SOLUTION INTRAVENOUS at 06:23

## 2025-04-01 RX ADMIN — FENTANYL CITRATE 50 MCG: 0.05 INJECTION, SOLUTION INTRAMUSCULAR; INTRAVENOUS at 07:29

## 2025-04-01 RX ADMIN — HYDROMORPHONE HYDROCHLORIDE 0.5 MG: 1 INJECTION, SOLUTION INTRAMUSCULAR; INTRAVENOUS; SUBCUTANEOUS at 09:38

## 2025-04-01 RX ADMIN — CEFAZOLIN 2000 MG: 1 INJECTION, POWDER, FOR SOLUTION INTRAMUSCULAR; INTRAVENOUS at 07:38

## 2025-04-01 ASSESSMENT — PAIN - FUNCTIONAL ASSESSMENT
PAIN_FUNCTIONAL_ASSESSMENT: PREVENTS OR INTERFERES SOME ACTIVE ACTIVITIES AND ADLS
PAIN_FUNCTIONAL_ASSESSMENT: PREVENTS OR INTERFERES SOME ACTIVE ACTIVITIES AND ADLS
PAIN_FUNCTIONAL_ASSESSMENT: 0-10
PAIN_FUNCTIONAL_ASSESSMENT: 0-10
PAIN_FUNCTIONAL_ASSESSMENT: PREVENTS OR INTERFERES SOME ACTIVE ACTIVITIES AND ADLS

## 2025-04-01 ASSESSMENT — PAIN DESCRIPTION - ORIENTATION
ORIENTATION: LEFT

## 2025-04-01 ASSESSMENT — LIFESTYLE VARIABLES: SMOKING_STATUS: 0

## 2025-04-01 ASSESSMENT — PAIN DESCRIPTION - LOCATION
LOCATION: TOE (COMMENT WHICH ONE)

## 2025-04-01 ASSESSMENT — PAIN DESCRIPTION - DESCRIPTORS
DESCRIPTORS: ACHING
DESCRIPTORS: ACHING
DESCRIPTORS: ACHING;THROBBING
DESCRIPTORS: ACHING

## 2025-04-01 ASSESSMENT — PAIN SCALES - GENERAL
PAINLEVEL_OUTOF10: 7
PAINLEVEL_OUTOF10: 7
PAINLEVEL_OUTOF10: 8
PAINLEVEL_OUTOF10: 7
PAINLEVEL_OUTOF10: 7

## 2025-04-01 NOTE — H&P
Pt Name: Catarino Damon III  MRN: [id number]  YOB: 1960  Date: 4/1/2025      HPI:  64 y.o. male who presents for surgery. The patient has known severe primary osteoarthritis of the left first MTP that has not been improved with injections or changes in shoewear.    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 Authorizing Provider   FLUoxetine (PROZAC) 20 MG capsule Take 1 capsule by mouth daily   Yes Isabel Anderson MD   ketorolac (TORADOL) 10 MG tablet Take 1 tablet by mouth every 6 hours as needed for Pain 11/2/24  Yes William Venegas MD   meloxicam (MOBIC) 15 MG tablet Take 1 tablet by mouth daily as needed for Pain 10/24/24  Yes William Venegas MD   HYDROcodone-acetaminophen (NORCO)  MG per tablet Take 1 tablet by mouth every 6 hours as needed. 6/2/23  Yes Isabel Anderson MD   clonazePAM (KLONOPIN) 0.5 MG tablet Take 1 tablet by mouth 2 times daily. 4/3/23  Yes Isabel Anderson MD   b complex vitamins capsule Take 1 capsule by mouth daily   Yes Isabel Anderson MD   testosterone cypionate (DEPOTESTOTERONE CYPIONATE) 200 MG/ML injection Inject into the muscle once a week. 6/2/23   Isabel Anderson MD       Allergies:  Patient has no known allergies.    Social History:   Social History     Occupational History    Not on file   Tobacco Use    Smoking status: Never    Smokeless tobacco: Never   Vaping Use    Vaping status: Never Used   Substance and Sexual Activity    Alcohol use: Never    Drug use: Never    Sexual activity:

## 2025-04-01 NOTE — ANESTHESIA POSTPROCEDURE EVALUATION
Department of Anesthesiology  Postprocedure Note    Patient: Catarino Damon III  MRN: [id number]  YOB: 1960  Date of evaluation: 4/1/2025    Procedure Summary       Date: 04/01/25 Room / Location: [address]    Anesthesia Start: 0727 Anesthesia Stop: 0854    Procedure: LEFT 1ST METATARSOPHALANGEAL JOINT FUSION (Left) Diagnosis:       Arthritis of first metatarsophalangeal (MTP) joint of left foot      (Arthritis of first metatarsophalangeal (MTP) joint of left foot [M19.072])    Surgeons: William Venegas MD Responsible Provider: Brayden Escalante APRN - CRNA    Anesthesia Type: general, regional ASA Status: 2            Anesthesia Type: No value filed.    Manuelito Phase I: Manuelito Score: 10    Manuelito Phase II:      Anesthesia Post Evaluation    Patient location during evaluation: PACU  Patient participation: complete - patient participated  Level of consciousness: sleepy but conscious  Pain score: 0  Airway patency: patent  Nausea & Vomiting: no nausea and no vomiting  Cardiovascular status: blood pressure returned to baseline  Respiratory status: acceptable  Pain management: adequate        No notable events documented.

## 2025-04-01 NOTE — ANESTHESIA PROCEDURE NOTES
Peripheral Block    Patient location during procedure: holding area  Reason for block: post-op pain management  Start time: 4/1/2025 7:09 AM  End time: 4/1/2025 7:11 AM  Staffing  Performed: anesthesiologist   Anesthesiologist: Jeff Parr MD  Performed by: Jeff Parr MD  Authorized by: Jeff Parr MD    Preanesthetic Checklist  Completed: patient identified, IV checked, site marked, risks and benefits discussed, surgical/procedural consents, equipment checked, pre-op evaluation, timeout performed, anesthesia consent given, oxygen available, monitors applied/VS acknowledged, fire risk safety assessment completed and verbalized and blood product R/B/A discussed and consented  Peripheral Block   Patient position: supine  Prep: ChloraPrep  Provider prep: mask and sterile gloves  Patient monitoring: continuous pulse ox and frequent blood pressure checks  Block type: Sciatic  Popliteal  Laterality: left  Injection technique: single-shot  Guidance: ultrasound guided  Local infiltration: lidocaine  Local infiltration: lidocaine    Needle   Needle type: Quincke   Needle gauge: 20 G  Needle localization: ultrasound guidance  Needle length: 10 cm  Assessment   Injection assessment: negative aspiration for heme, no paresthesia on injection, local visualized surrounding nerve on ultrasound and no intravascular symptoms  Paresthesia pain: none  Slow fractionated injection: yes  Hemodynamics: stable  Outcomes: uncomplicated and patient tolerated procedure well    Medications Administered  ropivacaine (NAROPIN) injection 0.5% - Perineural   20 mL - 4/1/2025 7:09:00 AM

## 2025-04-01 NOTE — BRIEF OP NOTE
Brief Postoperative Note      Patient: Catarino Damon III  YOB: 1960  MRN: [id number]    Date of Procedure: 4/1/2025    Pre-Op Diagnosis Codes:      * Arthritis of first metatarsophalangeal (MTP) joint of left foot [M19.072]    Post-Op Diagnosis: Same       Procedure(s):  LEFT 1ST METATARSOPHALANGEAL JOINT FUSION    Surgeon(s):  William Venegas MD    Assistant:  * No surgical staff found *    Anesthesia: Choice    Estimated Blood Loss (mL): Minimal    Complications: None    Specimens:   * No specimens in log *    Implants:  Implant Name Type Inv. Item Serial No.  Lot No. LRB No. Used Action   PLATE BNE STD L MT FT ANK TI CNTOUR LO PROF FOR COMPHSVE - [id number]  PLATE BNE STD L MT FT ANK TI CNTOUR LO PROF FOR COMPHSVE  [employer]  Left 1 Implanted   SCREW KRULOCK 3.0 X 16 - [id number]  SCREW KRULOCK 3.0 X 16  [employer]  Left 2 Implanted   BENOIT KREULOCK SCREW TI 3.0X12 - [id number]  BENOIT KREULOCK SCREW TI 3.0X12  [employer]  Left 1 Implanted   SCREW KRULOCK 3.0 X 20 - [id number]  SCREW KRULOCK 3.0 X 20  [employer]  Left 1 Implanted   SCREW CORTEX 3 X 18 - [id number]  SCREW CORTEX 3 X 18  [employer]  Left 2 Implanted   LP SCREW 3.0X16MM CORTICAL MTP TI - [id number]  LP SCREW 3.0X16MM CORTICAL MTP TI  [employer]  Left 1 Implanted         Drains: * No LDAs found *    Findings:  Infection Present At Time Of Surgery (PATOS) (choose all levels that have infection present):  No infection present  Other Findings: see op note    Electronically signed by William Venegas MD on 4/1/2025 at 8:45 AM

## 2025-04-01 NOTE — ANESTHESIA PRE PROCEDURE
Department of Anesthesiology  Preprocedure Note       Name:  Catarino Damon III   Age:  64 y.o.  :  1960                                          MRN:  [id number]         Date:  2025      Surgeon: Surgeon(s):  William Venegas MD    Procedure: Procedure(s):  LEFT 1ST METATARSOPHALANGEAL JOINT FUSION    Medications prior to admission:   Prior to Admission medications    Medication Sig Start Date End Date Taking? Authorizing Provider   FLUoxetine (PROZAC) 20 MG capsule Take 1 capsule by mouth daily   Yes Isabel Anderson MD   ketorolac (TORADOL) 10 MG tablet Take 1 tablet by mouth every 6 hours as needed for Pain 24  Yes William Venegas MD   meloxicam (MOBIC) 15 MG tablet Take 1 tablet by mouth daily as needed for Pain 10/24/24  Yes William Venegas MD   HYDROcodone-acetaminophen (NORCO)  MG per tablet Take 1 tablet by mouth every 6 hours as needed. 23  Yes Isabel Anderson MD   clonazePAM (KLONOPIN) 0.5 MG tablet Take 1 tablet by mouth 2 times daily. 4/3/23  Yes Isabel Anderson MD   b complex vitamins capsule Take 1 capsule by mouth daily   Yes Isabel Anderson MD   testosterone cypionate (DEPOTESTOTERONE CYPIONATE) 200 MG/ML injection Inject into the muscle once a week. 23   Isabel Anderson MD       Current medications:    Current Facility-Administered Medications   Medication Dose Route Frequency Provider Last Rate Last Admin   • lactated ringers infusion   IntraVENous Continuous Robert Morris  mL/hr at 25 0623 New Bag at 25 0623   • ceFAZolin (ANCEF) 2,000 mg in sterile water 20 mL IV syringe  2,000 mg IntraVENous Once William Venegas MD       • sodium chloride flush 0.9 % injection 5-40 mL  5-40 mL IntraVENous 2 times per day William Venegas MD       • sodium chloride flush 0.9 % injection 5-40 mL  5-40 mL IntraVENous PRN William Venegas MD       • 0.9 % sodium chloride infusion   IntraVENous PRN William Venegas MD

## 2025-04-01 NOTE — PROGRESS NOTES
CLINICAL PHARMACY NOTE: MEDS TO BEDS    Total # of Prescriptions Filled: 2   The following medications were delivered to the patient:  Discharge Medication List as of 4/1/2025  9:02 AM        START taking these medications    Details   ondansetron (ZOFRAN) 4 MG tablet Take 1 tablet by mouth every 8 hours as needed for Nausea or Vomiting, Disp-10 tablet, R-0Normal         Hydrocodone-acetaminoph  tabs        Additional Documentation:  Patients family picked up curbside. Paid with cash.

## 2025-04-15 NOTE — PROGRESS NOTES
Orthopaedic Clinic Note    NAME:  Catarino Damon III   : 1960  MRN: [id number]      2025     CHIEF COMPLAINT: Status post left first MTP fusion, 2025    HISTORY OF PRESENT ILLNESS:   Catarino returns today for follow up of the left foot.  Pain is controlled. There have been no postoperative complications to date.  He has been elevating and icing religiously.    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 Authorizing Provider   FLUoxetine (PROZAC) 20 MG capsule Take 1 capsule by mouth daily   Yes Isabel Anderson MD   meloxicam (MOBIC) 15 MG tablet Take 1 tablet by mouth daily as needed for Pain 10/24/24  Yes William Venegas MD   clonazePAM (KLONOPIN) 0.5 MG tablet Take 1 tablet by mouth 2 times daily. 4/3/23  Yes Isabel Anderson MD   testosterone cypionate (DEPOTESTOTERONE CYPIONATE) 200 MG/ML injection Inject into the muscle once a week. 23  Yes Isabel Anderson MD   b complex vitamins capsule Take 1 capsule by mouth daily   Yes Isabel Anderson MD   ondansetron (ZOFRAN) 4 MG tablet Take 1 tablet by mouth every 8 hours as needed for Nausea or Vomiting  Patient not taking: Reported on 2025   William Venegas MD       Allergies:  Patient has no known allergies.    PHYSICAL EXAM:    Left foot  Incision clean, dry, and intact.  No signs of infection. Neurovascularly intact.  Minimal swelling  Motion:

## 2025-04-16 ENCOUNTER — OFFICE VISIT (OUTPATIENT)
Age: 65
End: 2025-04-16

## 2025-04-16 VITALS — BODY MASS INDEX: 22.82 KG/M2 | WEIGHT: 163 LBS | HEIGHT: 71 IN

## 2025-04-16 DIAGNOSIS — M19.072 ARTHRITIS OF FIRST METATARSOPHALANGEAL (MTP) JOINT OF LEFT FOOT: Primary | ICD-10-CM

## 2025-04-16 PROCEDURE — 99024 POSTOP FOLLOW-UP VISIT: CPT | Performed by: ORTHOPAEDIC SURGERY

## 2025-05-06 DIAGNOSIS — M19.072 ARTHRITIS OF FIRST METATARSOPHALANGEAL (MTP) JOINT OF LEFT FOOT: Primary | ICD-10-CM

## 2025-05-06 RX ORDER — HYDROCODONE BITARTRATE AND ACETAMINOPHEN 5; 325 MG/1; MG/1
1 TABLET ORAL EVERY 8 HOURS PRN
Qty: 20 TABLET | Refills: 0 | Status: SHIPPED | OUTPATIENT
Start: 2025-05-06 | End: 2025-05-13

## 2025-05-06 NOTE — PROGRESS NOTES
Remaining air removed from R radial vasc band. Gauze and tegaderm dressing applied. Site is CDI. No bleeding or hematoma noted around site. Site and surrounding areas soft. +2 ewa radial pulses palpated. Skin normal in color, warm to touch, < 3 sec cap refill. Will continue to monitor pt.    Rx to pharmacy

## 2025-05-20 PROBLEM — Z47.89 AFTERCARE FOLLOWING SURGERY OF THE MUSCULOSKELETAL SYSTEM: Status: ACTIVE | Noted: 2025-05-20

## 2025-05-20 NOTE — PROGRESS NOTES
Orthopaedic Clinic Note    NAME:  Catarino Damon III   : 1960  MRN: [id number]      2025     CHIEF COMPLAINT: Status post left first MTP fusion, 2025    HISTORY OF PRESENT ILLNESS:   Catarino returns today for follow up of the left foot.  Pain is controlled. There have been no postoperative complications to date.  He has been elevating and icing religiously.  He states he continues to have a fairly severe amount of pain especially when his foot is not elevated.    Past Medical History:        Diagnosis Date    Anxiety     GERD (gastroesophageal reflux disease)     no meds; \"cured with acv\"    Nerve entrapment of lower limb     Sleep apnea     no cpap       Past Surgical History:        Procedure Laterality Date    ARM SURGERY Right     FOOT SURGERY Left 2025    LEFT 1ST METATARSOPHALANGEAL JOINT FUSION performed by William Venegas MD at [facility] OR    GROIN SURGERY      Nerve release    OTHER SURGICAL HISTORY      \"windpipe nodes\" at 9 yo due to histoplasmosis       Current Medications:   Prior to Admission medications    Medication Sig Start Date End Date Taking? Authorizing Provider   FLUoxetine (PROZAC) 20 MG capsule Take 1 capsule by mouth daily   Yes Isabel Anderson MD   meloxicam (MOBIC) 15 MG tablet Take 1 tablet by mouth daily as needed for Pain 10/24/24  Yes William Venegas MD   clonazePAM (KLONOPIN) 0.5 MG tablet Take 1 tablet by mouth 2 times daily. 4/3/23  Yes Isabel Anderson MD   testosterone cypionate (DEPOTESTOTERONE CYPIONATE) 200 MG/ML injection Inject into the muscle once a week. 23  Yes Isabel Anderson MD   b complex vitamins capsule Take 1 capsule by mouth daily   Yes Isabel Anderson MD   ondansetron (ZOFRAN) 4 MG tablet Take 1 tablet by mouth every 8 hours as needed for Nausea or Vomiting  Patient not taking: Reported on 2025   William Venegas MD       Allergies:  Morphine    PHYSICAL EXAM:    Left foot  Incision clean, dry, and intact.

## 2025-05-21 ENCOUNTER — OFFICE VISIT (OUTPATIENT)
Age: 65
End: 2025-05-21

## 2025-05-21 VITALS — BODY MASS INDEX: 23.34 KG/M2 | HEIGHT: 70 IN | WEIGHT: 163 LBS

## 2025-05-21 DIAGNOSIS — M19.072 ARTHRITIS OF FIRST METATARSOPHALANGEAL (MTP) JOINT OF LEFT FOOT: ICD-10-CM

## 2025-05-21 DIAGNOSIS — Z47.89 AFTERCARE FOLLOWING SURGERY OF THE MUSCULOSKELETAL SYSTEM: Primary | ICD-10-CM

## 2025-05-21 PROCEDURE — 99024 POSTOP FOLLOW-UP VISIT: CPT | Performed by: ORTHOPAEDIC SURGERY

## (undated) DEVICE — DRAPE C ARM W42XL120IN W POLY STRP W OUT LENS

## (undated) DEVICE — LIQUIBAND RAPID ADHESIVE 36/CS 0.8ML: Brand: MEDLINE

## (undated) DEVICE — SUTURE VICRYL + SZ 3-0 L27IN ABSRB UD L26MM SH 1/2 CIR VCP416H

## (undated) DEVICE — GUIDEWIRE ORTH L150MM DIA0.062IN W/ TRCR TIP DISP FOR ANK

## (undated) DEVICE — SHOE POSTOP L 11-13 LO PROF FOAM/NYLON MESH M LOOP LOK CLSR

## (undated) DEVICE — ZIMMER® STERILE DISPOSABLE TOURNIQUET CUFF WITH PLC, DUAL PORT, SINGLE BLADDER, 34 IN. (86 CM)

## (undated) DEVICE — PRECISION THIN (9.0 X 0.38 X 25.0MM)

## (undated) DEVICE — SUTURE VICRYL SZ 2-0 L27IN ABSRB UD L26MM SH 1/2 CIR J417H

## (undated) DEVICE — Device

## (undated) DEVICE — UNDERGLOVE SURG SZ 8 FNGR THK0.21MIL GRN LTX BEAD CUF

## (undated) DEVICE — GLOVE SURG SZ 8 CRM LTX FREE POLYISOPRENE POLYMER BEAD ANTI

## (undated) DEVICE — SUTURE ETHILON SZ 3-0 L18IN NONABSORBABLE BLK L24MM FS-1 3/8 663G

## (undated) DEVICE — NEPTUNE E-SEP SMOKE EVACUATION PENCIL, COATED, 70MM BLADE, ROCKER SWITCH: Brand: NEPTUNE E-SEP